# Patient Record
Sex: MALE | Race: WHITE | Employment: OTHER | URBAN - METROPOLITAN AREA
[De-identification: names, ages, dates, MRNs, and addresses within clinical notes are randomized per-mention and may not be internally consistent; named-entity substitution may affect disease eponyms.]

---

## 2017-01-01 ENCOUNTER — NURSE ONLY (OUTPATIENT)
Dept: CARDIOLOGY CLINIC | Age: 82
End: 2017-01-01

## 2017-01-01 ENCOUNTER — TELEPHONE (OUTPATIENT)
Dept: CARDIOLOGY CLINIC | Age: 82
End: 2017-01-01

## 2017-01-01 ENCOUNTER — HOSPITAL ENCOUNTER (OUTPATIENT)
Dept: OTHER | Age: 82
Discharge: HOME | End: 2017-04-10
Attending: INTERNAL MEDICINE | Admitting: INTERNAL MEDICINE

## 2017-01-01 ENCOUNTER — OFFICE VISIT (OUTPATIENT)
Dept: CARDIOLOGY CLINIC | Age: 82
End: 2017-01-01

## 2017-01-01 ENCOUNTER — HOSPITAL ENCOUNTER (OUTPATIENT)
Dept: OTHER | Age: 82
Discharge: HOME | End: 2017-08-07
Attending: INTERNAL MEDICINE | Admitting: INTERNAL MEDICINE

## 2017-01-01 ENCOUNTER — HOSPITAL ENCOUNTER (OUTPATIENT)
Dept: ULTRASOUND IMAGING | Age: 82
Discharge: HOME | End: 2017-05-03
Attending: INTERNAL MEDICINE | Admitting: INTERNAL MEDICINE

## 2017-01-01 ENCOUNTER — PROCEDURE VISIT (OUTPATIENT)
Dept: CARDIOLOGY CLINIC | Age: 82
End: 2017-01-01

## 2017-01-01 VITALS
WEIGHT: 172 LBS | DIASTOLIC BLOOD PRESSURE: 70 MMHG | HEART RATE: 80 BPM | BODY MASS INDEX: 24.62 KG/M2 | SYSTOLIC BLOOD PRESSURE: 130 MMHG | HEIGHT: 70 IN

## 2017-01-01 VITALS
BODY MASS INDEX: 23.95 KG/M2 | WEIGHT: 167.3 LBS | HEIGHT: 70 IN | DIASTOLIC BLOOD PRESSURE: 50 MMHG | SYSTOLIC BLOOD PRESSURE: 110 MMHG | HEART RATE: 60 BPM

## 2017-01-01 DIAGNOSIS — I25.5 ISCHEMIC CARDIOMYOPATHY: ICD-10-CM

## 2017-01-01 DIAGNOSIS — Z95.810 AUTOMATIC IMPLANTABLE CARDIOVERTER-DEFIBRILLATOR IN SITU: Chronic | ICD-10-CM

## 2017-01-01 DIAGNOSIS — I50.22 CHRONIC SYSTOLIC HEART FAILURE (HCC): ICD-10-CM

## 2017-01-01 DIAGNOSIS — N28.9 RENAL INSUFFICIENCY: ICD-10-CM

## 2017-01-01 DIAGNOSIS — I10 ESSENTIAL HYPERTENSION, BENIGN: Chronic | ICD-10-CM

## 2017-01-01 DIAGNOSIS — E78.2 MIXED HYPERLIPIDEMIA: ICD-10-CM

## 2017-01-01 DIAGNOSIS — I10 ESSENTIAL HYPERTENSION: ICD-10-CM

## 2017-01-01 DIAGNOSIS — I73.9 PERIPHERAL VASCULAR DISEASE (HCC): ICD-10-CM

## 2017-01-01 DIAGNOSIS — Z95.810 CARDIAC DEFIBRILLATOR IN PLACE: ICD-10-CM

## 2017-01-01 DIAGNOSIS — I50.20 CHF (CONGESTIVE HEART FAILURE), NYHA CLASS II, UNSPECIFIED FAILURE CHRONICITY, SYSTOLIC (HCC): Primary | Chronic | ICD-10-CM

## 2017-01-01 DIAGNOSIS — I25.10 ATHEROSCLEROSIS OF NATIVE CORONARY ARTERY OF NATIVE HEART WITHOUT ANGINA PECTORIS: Chronic | ICD-10-CM

## 2017-01-01 DIAGNOSIS — I48.20 CHRONIC ATRIAL FIBRILLATION (HCC): Chronic | ICD-10-CM

## 2017-01-01 DIAGNOSIS — I50.22 CHRONIC SYSTOLIC HEART FAILURE (HCC): Primary | ICD-10-CM

## 2017-01-01 DIAGNOSIS — I10 ESSENTIAL HYPERTENSION, BENIGN: Primary | Chronic | ICD-10-CM

## 2017-01-01 DIAGNOSIS — I10 ESSENTIAL (PRIMARY) HYPERTENSION: ICD-10-CM

## 2017-01-01 LAB
ALBUMIN SERPL-MCNC: 3.8 G/DL (ref 3.4–5)
ALT SERPL-CCNC: 14 U/L (ref 10–40)
ANION GAP SERPL CALCULATED.3IONS-SCNC: 15 MMOL/L (ref 3–16)
AST SERPL-CCNC: 24 U/L (ref 15–37)
BUN BLDV-MCNC: 44 MG/DL (ref 7–20)
BUN BLDV-MCNC: 54 MG/DL (ref 7–20)
BUN BLDV-MCNC: 60 MG/DL (ref 7–20)
CALCIUM SERPL-MCNC: 9.1 MG/DL (ref 8.3–10.6)
CALCIUM SERPL-MCNC: 9.2 MG/DL (ref 8.3–10.6)
CALCIUM SERPL-MCNC: 9.5 MG/DL (ref 8.3–10.6)
CHLORIDE BLD-SCNC: 101 MMOL/L (ref 99–110)
CHLORIDE BLD-SCNC: 97 MMOL/L (ref 99–110)
CHLORIDE BLD-SCNC: 99 MMOL/L (ref 99–110)
CHOLESTEROL, TOTAL: 125 MG/DL (ref 0–199)
CO2: 27 MMOL/L (ref 21–32)
CO2: 27 MMOL/L (ref 21–32)
CO2: 28 MMOL/L (ref 21–32)
CREAT SERPL-MCNC: 1.8 MG/DL (ref 0.8–1.3)
CREAT SERPL-MCNC: 1.8 MG/DL (ref 0.8–1.3)
CREAT SERPL-MCNC: 2 MG/DL (ref 0.8–1.3)
GFR AFRICAN AMERICAN: 38
GFR AFRICAN AMERICAN: 43
GFR AFRICAN AMERICAN: 43
GFR NON-AFRICAN AMERICAN: 32
GFR NON-AFRICAN AMERICAN: 36
GFR NON-AFRICAN AMERICAN: 36
GLUCOSE BLD-MCNC: 103 MG/DL (ref 70–99)
GLUCOSE BLD-MCNC: 118 MG/DL (ref 70–99)
GLUCOSE BLD-MCNC: 152 MG/DL (ref 70–99)
HDLC SERPL-MCNC: 33 MG/DL (ref 40–60)
LDL CHOLESTEROL CALCULATED: 68 MG/DL
PHOSPHORUS: 4 MG/DL (ref 2.5–4.9)
POTASSIUM SERPL-SCNC: 3.9 MMOL/L (ref 3.5–5.1)
POTASSIUM SERPL-SCNC: 4.2 MMOL/L (ref 3.5–5.1)
POTASSIUM SERPL-SCNC: 4.5 MMOL/L (ref 3.5–5.1)
SODIUM BLD-SCNC: 140 MMOL/L (ref 136–145)
SODIUM BLD-SCNC: 141 MMOL/L (ref 136–145)
SODIUM BLD-SCNC: 143 MMOL/L (ref 136–145)
TRIGL SERPL-MCNC: 119 MG/DL (ref 0–150)
VLDLC SERPL CALC-MCNC: 24 MG/DL

## 2017-01-01 PROCEDURE — 93297 REM INTERROG DEV EVAL ICPMS: CPT | Performed by: INTERNAL MEDICINE

## 2017-01-01 PROCEDURE — 93296 REM INTERROG EVL PM/IDS: CPT | Performed by: INTERNAL MEDICINE

## 2017-01-01 PROCEDURE — 93295 DEV INTERROG REMOTE 1/2/MLT: CPT | Performed by: INTERNAL MEDICINE

## 2017-01-01 PROCEDURE — 1036F TOBACCO NON-USER: CPT | Performed by: INTERNAL MEDICINE

## 2017-01-01 PROCEDURE — G8420 CALC BMI NORM PARAMETERS: HCPCS | Performed by: INTERNAL MEDICINE

## 2017-01-01 PROCEDURE — 4040F PNEUMOC VAC/ADMIN/RCVD: CPT | Performed by: INTERNAL MEDICINE

## 2017-01-01 PROCEDURE — 1123F ACP DISCUSS/DSCN MKR DOCD: CPT | Performed by: INTERNAL MEDICINE

## 2017-01-01 PROCEDURE — G8598 ASA/ANTIPLAT THER USED: HCPCS | Performed by: INTERNAL MEDICINE

## 2017-01-01 PROCEDURE — G8427 DOCREV CUR MEDS BY ELIG CLIN: HCPCS | Performed by: INTERNAL MEDICINE

## 2017-01-01 PROCEDURE — 93284 PRGRMG EVAL IMPLANTABLE DFB: CPT | Performed by: INTERNAL MEDICINE

## 2017-01-01 PROCEDURE — 99214 OFFICE O/P EST MOD 30 MIN: CPT | Performed by: INTERNAL MEDICINE

## 2017-01-01 PROCEDURE — G8484 FLU IMMUNIZE NO ADMIN: HCPCS | Performed by: INTERNAL MEDICINE

## 2017-01-01 PROCEDURE — 93290 INTERROG DEV EVAL ICPMS IP: CPT | Performed by: INTERNAL MEDICINE

## 2017-01-01 RX ORDER — LISINOPRIL 10 MG/1
10 TABLET ORAL DAILY
Qty: 90 TABLET | Refills: 3 | Status: SHIPPED | OUTPATIENT
Start: 2017-01-01 | End: 2017-01-01

## 2017-01-01 RX ORDER — DIGOXIN 125 MCG
125 TABLET ORAL DAILY
Qty: 90 TABLET | Refills: 3 | Status: SHIPPED | OUTPATIENT
Start: 2017-01-01 | End: 2017-01-01 | Stop reason: SDUPTHER

## 2017-01-01 RX ORDER — ASPIRIN 81 MG/1
81 TABLET ORAL DAILY
Qty: 90 TABLET | Refills: 3 | Status: SHIPPED | OUTPATIENT
Start: 2017-01-01

## 2017-01-01 RX ORDER — TORSEMIDE 100 MG/1
50 TABLET ORAL DAILY
Qty: 45 TABLET | Refills: 3 | Status: SHIPPED | OUTPATIENT
Start: 2017-01-01 | End: 2017-01-01 | Stop reason: SDUPTHER

## 2017-01-01 RX ORDER — EPLERENONE 25 MG/1
25 TABLET, FILM COATED ORAL DAILY
Qty: 90 TABLET | Refills: 3 | Status: SHIPPED | OUTPATIENT
Start: 2017-01-01 | End: 2018-01-01 | Stop reason: SINTOL

## 2017-01-01 RX ORDER — ATORVASTATIN CALCIUM 40 MG/1
40 TABLET, FILM COATED ORAL DAILY
Qty: 90 TABLET | Refills: 3 | Status: CANCELLED | OUTPATIENT
Start: 2017-01-01

## 2017-01-01 RX ORDER — CARVEDILOL 12.5 MG/1
TABLET ORAL
Qty: 180 TABLET | Refills: 3 | Status: SHIPPED | OUTPATIENT
Start: 2017-01-01 | End: 2017-01-01

## 2017-01-01 RX ORDER — ATORVASTATIN CALCIUM 40 MG/1
40 TABLET, FILM COATED ORAL DAILY
Qty: 90 TABLET | Refills: 3 | Status: SHIPPED | OUTPATIENT
Start: 2017-01-01

## 2017-01-01 RX ORDER — CLOPIDOGREL BISULFATE 75 MG/1
TABLET ORAL
Qty: 90 TABLET | Refills: 3 | Status: SHIPPED | OUTPATIENT
Start: 2017-01-01

## 2017-01-01 RX ORDER — LISINOPRIL 10 MG/1
TABLET ORAL
Qty: 90 TABLET | Refills: 3 | Status: SHIPPED | OUTPATIENT
Start: 2017-01-01 | End: 2017-01-01 | Stop reason: SDUPTHER

## 2017-01-01 RX ORDER — CARVEDILOL 12.5 MG/1
TABLET ORAL
Qty: 180 TABLET | Refills: 3 | Status: SHIPPED | OUTPATIENT
Start: 2017-01-01

## 2017-01-01 RX ORDER — TORSEMIDE 100 MG/1
50 TABLET ORAL DAILY
Qty: 45 TABLET | Refills: 3 | Status: SHIPPED | OUTPATIENT
Start: 2017-01-01

## 2017-01-01 RX ORDER — CARVEDILOL 12.5 MG/1
TABLET ORAL
Qty: 180 TABLET | Refills: 3 | Status: SHIPPED | OUTPATIENT
Start: 2017-01-01 | End: 2017-01-01 | Stop reason: SDUPTHER

## 2017-01-01 RX ORDER — LISINOPRIL 10 MG/1
TABLET ORAL
Qty: 90 TABLET | Refills: 3 | Status: SHIPPED | OUTPATIENT
Start: 2017-01-01 | End: 2018-01-01 | Stop reason: SINTOL

## 2017-01-01 RX ORDER — TORSEMIDE 100 MG/1
TABLET ORAL
Qty: 45 TABLET | Refills: 3 | Status: SHIPPED | OUTPATIENT
Start: 2017-01-01 | End: 2017-01-01

## 2017-01-01 RX ORDER — DIGOXIN 125 MCG
125 TABLET ORAL DAILY
Qty: 90 TABLET | Refills: 3 | Status: ON HOLD | OUTPATIENT
Start: 2017-01-01 | End: 2018-01-01 | Stop reason: HOSPADM

## 2017-01-01 RX ORDER — EPLERENONE 25 MG/1
TABLET, FILM COATED ORAL
Qty: 90 TABLET | Refills: 3 | Status: SHIPPED | OUTPATIENT
Start: 2017-01-01 | End: 2018-01-01 | Stop reason: SDUPTHER

## 2017-01-01 RX ORDER — CLOPIDOGREL BISULFATE 75 MG/1
TABLET ORAL
Qty: 90 TABLET | Refills: 3 | Status: SHIPPED | OUTPATIENT
Start: 2017-01-01 | End: 2017-01-01 | Stop reason: SDUPTHER

## 2017-01-18 ENCOUNTER — NURSE ONLY (OUTPATIENT)
Dept: CARDIOLOGY CLINIC | Age: 82
End: 2017-01-18

## 2017-01-18 DIAGNOSIS — Z95.810 CARDIAC DEFIBRILLATOR IN PLACE: ICD-10-CM

## 2017-01-18 DIAGNOSIS — I25.5 ISCHEMIC CARDIOMYOPATHY: ICD-10-CM

## 2017-01-18 DIAGNOSIS — I50.22 CHRONIC SYSTOLIC HEART FAILURE (HCC): ICD-10-CM

## 2017-01-18 DIAGNOSIS — Z95.810 AUTOMATIC IMPLANTABLE CARDIOVERTER-DEFIBRILLATOR IN SITU: Chronic | ICD-10-CM

## 2017-01-18 PROCEDURE — 93296 REM INTERROG EVL PM/IDS: CPT | Performed by: INTERNAL MEDICINE

## 2017-01-18 PROCEDURE — 93295 DEV INTERROG REMOTE 1/2/MLT: CPT | Performed by: INTERNAL MEDICINE

## 2017-01-18 PROCEDURE — 93297 REM INTERROG DEV EVAL ICPMS: CPT | Performed by: INTERNAL MEDICINE

## 2017-01-26 ENCOUNTER — TELEPHONE (OUTPATIENT)
Dept: CARDIOLOGY CLINIC | Age: 82
End: 2017-01-26

## 2017-10-03 NOTE — TELEPHONE ENCOUNTER
Does pt need any labs prior to 10/23/17 appt? Pls call to advise Thank you    Medication Refill    When was your last appointment with cardiology? 04/17/17 (upcoming appt 10/23/17)  (if 1year or longer, please schedule an appointment)    Medication needing refilled:  ALL the medications we refill for him except the Atorvastatin     Doseage of the medication:    How are you taking this medication (QD, BID, TID, QID, PRN):    Patient want a 30 or 90 day supply called in:90    Which Pharmacy are we sending the medication to:[facility], [address] [place] [address]    Pharmacy Phone number:    Pharmacy Fax number:

## 2017-10-23 NOTE — PROGRESS NOTES
[facility]   Cardiac Consultation    Referring Provider:  Nel Carroll MD     Chief Complaint   Patient presents with    6 Month Follow-Up    Congestive Heart Failure    Coronary Artery Disease    Hypertension      History of Present Illness:   Mr. Randy Starkey is seen today for management of ischemic cardiomyopathy/AICD, CAD, hyperlipidemia, hypertension. In the interval since his last visit, he was treated for gout by his pcp. Today, Mary Vanegas states that he has been feeling well overall. He walks with the aid of a cane/walker. He uses the walker in his house, but will only use a cane outside (in public) He has no chest pain, change in exertional shortness of breath palpitations or dizziness. He does have swelling in bilateral ankles. He is here today by himself    Past Medical History:   has a past medical history of Arthritis; CAD (coronary artery disease); CHF (congestive heart failure) ([address] and Hypertension. Surgical History:   has a past surgical history that includes fracture surgery; eye surgery; Cardiac surgery; and hernia repair. Social History:   reports that he has quit smoking. He has quit using smokeless tobacco. He reports that he drinks about 0.6 oz of alcohol per week . He reports that he does not use drugs. Family History:  family history includes Heart Disease in his brother, father, and mother.      Current Outpatient Prescriptions   Medication Sig Dispense Refill    lisinopril (PRINIVIL;ZESTRIL) 10 MG tablet Take 1 tablet by mouth  daily 90 tablet 3    clopidogrel (PLAVIX) 75 MG tablet Take 1 tablet by mouth  daily 90 tablet 3    carvedilol (COREG) 12.5 MG tablet Take 1 tablet by mouth  twice a day 180 tablet 3    digoxin (LANOXIN) 125 MCG tablet Take 1 tablet by mouth daily 90 tablet 3    torsemide (DEMADEX) 100 MG tablet Take 0.5 tablets by mouth daily 45 tablet 3    eplerenone (INSPRA) 25 MG tablet Take 1 tablet by mouth daily 90 tablet 3    aspirin 81 and normal   III/VI holosystolic murmur  · Cervical veins are not engorged  · The carotid upstroke is normal in amplitude and contour without delay or bruit  · Peripheral pulses are symmetrical and full  · There is no clubbing, cyanosis of the extremities. · Bilateral edema  · Femoral Arteries: 2+ and equal  · Pedal Pulses: 2+ and equal   Abdomen:  · No masses or tenderness  · Liver/Spleen: No Abnormalities Noted  Neurological/Psychiatric:  · Alert and oriented in all spheres  · Moves all extremities well  · Walks with cane  · No abnormalities of mood, affect, memory, mentation, or behavior are noted    Assessment:    1. Coronary atherosclerosis of native coronary artery:  Stable. No complaints of angina. Due to history of vascular disease would favor staying on DAPT with Plavix & aspirin. 4/2007 Myoview GXT > large size, severe apical, inferoseptal, inferior and inferolateral fixed defect with EF 39%. 2. CHF (congestive heart failure), NYHA class II: No evidence of CHF by exam today. Weighs himself daily--recommend support hose for bilateral edema   3. Other primary cardiomyopathies:  Stable. 2009 ECHO> EF 25%. 4. Automatic implantable cardiac defibrillator in situ: Interrogated regularly, no recent shocks.-   5. Essential hypertension, benign: BP (!) 110/50 (Site: Left Arm, Position: Sitting, Cuff Size: Medium Adult)   Pulse 60   Ht 5' 10\" (1.778 m)   Wt 167 lb 4.8 oz (75.9 kg)   BMI 24.01 kg/m²      6. Hyperlipidemia   4/17  Tc 125  hdl 33 ldl 68 tri 119   7. Renal insufficiency--8/7/17  Creat 1.8      Plan  Stable cardiac status at this time  1. Continue all medications  2. Call for any changes  3. Recommend support hose for swelling  4. Follow up in 6months, lipid liver renal panel prior      I appreciate the opportunity of cooperating in the care of this individual.    Tyshawn Mckenzie.  Christina Mercado M.D., [facility] [place]

## 2017-10-23 NOTE — LETTER
· Endocrine: No malaise, fatigue or temperature intolerance. No excessive thirst, fluid intake, or urination. No tremor. · Hematologic/Lymphatic: No abnormal bruising or bleeding, blood clots or swollen lymph nodes. · Allergic/Immunologic: No nasal congestion or hives. Physical Examination:    Vitals:    10/23/17 0953   BP: (!) 110/50   Pulse: 60        Constitutional and General Appearance:  Appears stated age, NAD  Respiratory:  · Normal excursion and expansion without use of accessory muscles  · Resp Auscultation: Normal breath sounds without dullness  Cardiovascular:  · The apical impulses not displaced  · Heart tones are crisp and normal   III/VI holosystolic murmur  · Cervical veins are not engorged  · The carotid upstroke is normal in amplitude and contour without delay or bruit  · Peripheral pulses are symmetrical and full  · There is no clubbing, cyanosis of the extremities. · Bilateral edema  · Femoral Arteries: 2+ and equal  · Pedal Pulses: 2+ and equal   Abdomen:  · No masses or tenderness  · Liver/Spleen: No Abnormalities Noted  Neurological/Psychiatric:  · Alert and oriented in all spheres  · Moves all extremities well  · Walks with cane  · No abnormalities of mood, affect, memory, mentation, or behavior are noted    Assessment:    1. Coronary atherosclerosis of native coronary artery:  Stable. No complaints of angina. Due to history of vascular disease would favor staying on DAPT with Plavix & aspirin. 4/2007 Myoview GXT > large size, severe apical, inferoseptal, inferior and inferolateral fixed defect with EF 39%. 2. CHF (congestive heart failure), NYHA class II: No evidence of CHF by exam today. Weighs himself daily--recommend support hose for bilateral edema   3. Other primary cardiomyopathies:  Stable. 2009 ECHO> EF 25%. 4. Automatic implantable cardiac defibrillator in situ: Interrogated regularly, no recent shocks. -

## 2017-10-23 NOTE — COMMUNICATION BODY
[address]   Cardiac Consultation    Referring Provider:  Shilpi Ching MD     Chief Complaint   Patient presents with    6 Month Follow-Up    Congestive Heart Failure    Coronary Artery Disease    Hypertension      History of Present Illness:   Mr. Florian Estrella is seen today for management of ischemic cardiomyopathy/AICD, CAD, hyperlipidemia, hypertension. In the interval since his last visit, he was treated for gout by his pcp. Today, Jluis Waldron states that he has been feeling well overall. He walks with the aid of a cane/walker. He uses the walker in his house, but will only use a cane outside (in public) He has no chest pain, change in exertional shortness of breath palpitations or dizziness. He does have swelling in bilateral ankles. He is here today by himself    Past Medical History:   has a past medical history of Arthritis; CAD (coronary artery disease); CHF (congestive heart failure) ([address] and Hypertension. Surgical History:   has a past surgical history that includes fracture surgery; eye surgery; Cardiac surgery; and hernia repair. Social History:   reports that he has quit smoking. He has quit using smokeless tobacco. He reports that he drinks about 0.6 oz of alcohol per week . He reports that he does not use drugs. Family History:  family history includes Heart Disease in his brother, father, and mother.      Current Outpatient Prescriptions   Medication Sig Dispense Refill    lisinopril (PRINIVIL;ZESTRIL) 10 MG tablet Take 1 tablet by mouth  daily 90 tablet 3    clopidogrel (PLAVIX) 75 MG tablet Take 1 tablet by mouth  daily 90 tablet 3    carvedilol (COREG) 12.5 MG tablet Take 1 tablet by mouth  twice a day 180 tablet 3    digoxin (LANOXIN) 125 MCG tablet Take 1 tablet by mouth daily 90 tablet 3    torsemide (DEMADEX) 100 MG tablet Take 0.5 tablets by mouth daily 45 tablet 3    eplerenone (INSPRA) 25 MG tablet Take 1 tablet by mouth daily 90 tablet 3    aspirin 81 MG EC tablet Take 1 tablet by mouth daily 90 tablet 3    atorvastatin (LIPITOR) 40 MG tablet Take 1 tablet by mouth daily 30 tablet 12    eplerenone (INSPRA) 25 MG tablet Take 1 tablet by mouth  daily 90 tablet 3     No current facility-administered medications for this visit. Allergies:  Review of patient's allergies indicates no known allergies. Review of Systems:   · Constitutional: there has been no unanticipated weight loss. There's been no change in energy level, sleep pattern, or activity level. · Eyes: No visual changes or diplopia. No scleral icterus. · ENT: No Headaches, hearing loss or vertigo. No mouth sores or sore throat. · Cardiovascular: Reviewed in HPI    · Respiratory: No cough or wheezing, no sputum production. No hematemesis. · Gastrointestinal: No abdominal pain, appetite loss, blood in stools. No change in bowel or bladder habits. · Genitourinary: No dysuria, trouble voiding, or hematuria. · Musculoskeletal:  General weakness/balance issues --arthritic back and ankle pain Using a cane. · Integumentary: No rash or pruritis. · Neurological: No headache, diplopia, change in muscle strength, numbness or tingling. No change in gait, balance, coordination, mood, affect, memory, mentation, behavior. · Psychiatric: No anxiety, no depression. · Endocrine: No malaise, fatigue or temperature intolerance. No excessive thirst, fluid intake, or urination. No tremor. · Hematologic/Lymphatic: No abnormal bruising or bleeding, blood clots or swollen lymph nodes. · Allergic/Immunologic: No nasal congestion or hives.     Physical Examination:    Vitals:    10/23/17 0953   BP: (!) 110/50   Pulse: 60        Constitutional and General Appearance:  Appears stated age, NAD  Respiratory:  · Normal excursion and expansion without use of accessory muscles  · Resp Auscultation: Normal breath sounds without dullness  Cardiovascular:  · The apical impulses not displaced  · Heart tones are crisp

## 2017-10-25 NOTE — PROGRESS NOTES
Carelink transmission shows normal sensing and pacing function. Patient remains in atrial fibrillation. See interrogation for more details. Optivol slightly elevated. Call out to pt.

## 2017-12-28 NOTE — TELEPHONE ENCOUNTER
Called regarding a prior auth for : eplerenone (INSPRA) 25 MG tablet    Please call to discuss.   Thank you [facility]

## 2018-01-01 ENCOUNTER — OFFICE VISIT (OUTPATIENT)
Dept: CARDIOLOGY CLINIC | Age: 83
End: 2018-01-01

## 2018-01-01 ENCOUNTER — HOSPITAL ENCOUNTER (OUTPATIENT)
Dept: OTHER | Age: 83
Discharge: HOME | End: 2018-03-31
Attending: NURSE PRACTITIONER | Admitting: NURSE PRACTITIONER

## 2018-01-01 ENCOUNTER — TELEPHONE (OUTPATIENT)
Dept: CARDIOLOGY CLINIC | Age: 83
End: 2018-01-01

## 2018-01-01 ENCOUNTER — NURSE ONLY (OUTPATIENT)
Dept: CARDIOLOGY CLINIC | Age: 83
End: 2018-01-01

## 2018-01-01 ENCOUNTER — HOSPITAL ENCOUNTER (OUTPATIENT)
Dept: OTHER | Age: 83
Discharge: HOME | End: 2018-04-30
Attending: NURSE PRACTITIONER | Admitting: NURSE PRACTITIONER

## 2018-01-01 VITALS
SYSTOLIC BLOOD PRESSURE: 128 MMHG | WEIGHT: 171 LBS | HEART RATE: 66 BPM | OXYGEN SATURATION: 91 % | BODY MASS INDEX: 24.54 KG/M2 | DIASTOLIC BLOOD PRESSURE: 70 MMHG

## 2018-01-01 VITALS
WEIGHT: 184 LBS | SYSTOLIC BLOOD PRESSURE: 106 MMHG | OXYGEN SATURATION: 96 % | HEIGHT: 70 IN | DIASTOLIC BLOOD PRESSURE: 74 MMHG | HEART RATE: 62 BPM | BODY MASS INDEX: 26.34 KG/M2

## 2018-01-01 DIAGNOSIS — I25.10 ATHEROSCLEROSIS OF NATIVE CORONARY ARTERY OF NATIVE HEART WITHOUT ANGINA PECTORIS: Chronic | ICD-10-CM

## 2018-01-01 DIAGNOSIS — Z95.810 AUTOMATIC IMPLANTABLE CARDIOVERTER-DEFIBRILLATOR IN SITU: Chronic | ICD-10-CM

## 2018-01-01 DIAGNOSIS — Z95.810 CARDIAC DEFIBRILLATOR IN PLACE: ICD-10-CM

## 2018-01-01 DIAGNOSIS — R06.09 DOE (DYSPNEA ON EXERTION): ICD-10-CM

## 2018-01-01 DIAGNOSIS — I50.21 ACUTE SYSTOLIC CONGESTIVE HEART FAILURE, NYHA CLASS 2 (HCC): Primary | Chronic | ICD-10-CM

## 2018-01-01 DIAGNOSIS — E78.2 MIXED HYPERLIPIDEMIA: ICD-10-CM

## 2018-01-01 DIAGNOSIS — I10 ESSENTIAL HYPERTENSION, BENIGN: Chronic | ICD-10-CM

## 2018-01-01 DIAGNOSIS — I25.5 ISCHEMIC CARDIOMYOPATHY: ICD-10-CM

## 2018-01-01 DIAGNOSIS — I25.10 ATHEROSCLEROSIS OF NATIVE CORONARY ARTERY OF NATIVE HEART WITHOUT ANGINA PECTORIS: Primary | Chronic | ICD-10-CM

## 2018-01-01 DIAGNOSIS — I50.22 CHRONIC SYSTOLIC HEART FAILURE (HCC): ICD-10-CM

## 2018-01-01 DIAGNOSIS — I50.21 ACUTE SYSTOLIC CONGESTIVE HEART FAILURE, NYHA CLASS 2 (HCC): Chronic | ICD-10-CM

## 2018-01-01 LAB
ANION GAP SERPL CALCULATED.3IONS-SCNC: 11 MMOL/L (ref 3–16)
BUN BLDV-MCNC: 85 MG/DL (ref 7–20)
CALCIUM SERPL-MCNC: 9 MG/DL (ref 8.3–10.6)
CHLORIDE BLD-SCNC: 93 MMOL/L (ref 99–110)
CO2: 29 MMOL/L (ref 21–32)
CREAT SERPL-MCNC: 2.3 MG/DL (ref 0.8–1.3)
GFR AFRICAN AMERICAN: 33
GFR NON-AFRICAN AMERICAN: 27
GLUCOSE BLD-MCNC: 150 MG/DL (ref 70–99)
POTASSIUM SERPL-SCNC: 5.1 MMOL/L (ref 3.5–5.1)
PRO-BNP: 5548 PG/ML (ref 0–449)
SODIUM BLD-SCNC: 133 MMOL/L (ref 136–145)

## 2018-01-01 PROCEDURE — 1036F TOBACCO NON-USER: CPT | Performed by: NURSE PRACTITIONER

## 2018-01-01 PROCEDURE — 4040F PNEUMOC VAC/ADMIN/RCVD: CPT | Performed by: NURSE PRACTITIONER

## 2018-01-01 PROCEDURE — 1123F ACP DISCUSS/DSCN MKR DOCD: CPT | Performed by: NURSE PRACTITIONER

## 2018-01-01 PROCEDURE — 4040F PNEUMOC VAC/ADMIN/RCVD: CPT | Performed by: INTERNAL MEDICINE

## 2018-01-01 PROCEDURE — 1036F TOBACCO NON-USER: CPT | Performed by: INTERNAL MEDICINE

## 2018-01-01 PROCEDURE — 93295 DEV INTERROG REMOTE 1/2/MLT: CPT | Performed by: INTERNAL MEDICINE

## 2018-01-01 PROCEDURE — G8427 DOCREV CUR MEDS BY ELIG CLIN: HCPCS | Performed by: INTERNAL MEDICINE

## 2018-01-01 PROCEDURE — 99214 OFFICE O/P EST MOD 30 MIN: CPT | Performed by: INTERNAL MEDICINE

## 2018-01-01 PROCEDURE — G8427 DOCREV CUR MEDS BY ELIG CLIN: HCPCS | Performed by: NURSE PRACTITIONER

## 2018-01-01 PROCEDURE — 1111F DSCHRG MED/CURRENT MED MERGE: CPT | Performed by: NURSE PRACTITIONER

## 2018-01-01 PROCEDURE — 93296 REM INTERROG EVL PM/IDS: CPT | Performed by: INTERNAL MEDICINE

## 2018-01-01 PROCEDURE — G8598 ASA/ANTIPLAT THER USED: HCPCS | Performed by: INTERNAL MEDICINE

## 2018-01-01 PROCEDURE — G8598 ASA/ANTIPLAT THER USED: HCPCS | Performed by: NURSE PRACTITIONER

## 2018-01-01 PROCEDURE — G8420 CALC BMI NORM PARAMETERS: HCPCS | Performed by: NURSE PRACTITIONER

## 2018-01-01 PROCEDURE — 99214 OFFICE O/P EST MOD 30 MIN: CPT | Performed by: NURSE PRACTITIONER

## 2018-01-01 PROCEDURE — 1123F ACP DISCUSS/DSCN MKR DOCD: CPT | Performed by: INTERNAL MEDICINE

## 2018-01-01 PROCEDURE — 93297 REM INTERROG DEV EVAL ICPMS: CPT | Performed by: INTERNAL MEDICINE

## 2018-01-01 PROCEDURE — G8484 FLU IMMUNIZE NO ADMIN: HCPCS | Performed by: INTERNAL MEDICINE

## 2018-01-01 PROCEDURE — G8484 FLU IMMUNIZE NO ADMIN: HCPCS | Performed by: NURSE PRACTITIONER

## 2018-01-01 PROCEDURE — G8419 CALC BMI OUT NRM PARAM NOF/U: HCPCS | Performed by: INTERNAL MEDICINE

## 2018-01-01 RX ORDER — SPIRONOLACTONE 25 MG/1
12.5 TABLET ORAL DAILY
COMMUNITY
End: 2018-01-01 | Stop reason: SDUPTHER

## 2018-01-01 RX ORDER — SPIRONOLACTONE 25 MG/1
12.5 TABLET ORAL DAILY
Qty: 45 TABLET | Refills: 2 | Status: SHIPPED | OUTPATIENT
Start: 2018-01-01 | End: 2018-01-01 | Stop reason: SINTOL

## 2018-01-10 NOTE — TELEPHONE ENCOUNTER
Spoke with patient informed him that his insurance will not cover his Inspra 25 mg and that he would like for him to aldactone 12.5 mg daily.   E prescribed prescription for aldactone 25 mg # 45 with 2 refills to [facility] [phone]

## 2018-01-31 NOTE — PROGRESS NOTES
Carelink transmission shows normal functioning device. Atrial arrhythmias noted. No anti coagulation per . Optivol elevated, call to pt for symptoms.

## 2018-02-14 NOTE — TELEPHONE ENCOUNTER
Pt was seen at foot doctor on Monday and was referred to PCP who stated pt's lungs seemed fine. Pt is c/o depression, has no CP, but has some swelling. No complaints of weight gain. Has been SOB x Saturday but pt's daughter doesn't visually seen labored breathing.  Please advise thank you

## 2018-02-19 PROBLEM — E87.70 VOLUME OVERLOAD: Status: ACTIVE | Noted: 2018-01-01

## 2018-02-19 PROBLEM — R06.09 DOE (DYSPNEA ON EXERTION): Status: ACTIVE | Noted: 2018-01-01

## 2018-02-19 NOTE — LETTER
[address] [address] [address] [address] [address] [address]  Phone: [phone]  Fax: [phone]    Price Snowden MD        March 7, 2018     Tin Mott, [id number]  [facility] [address]    Patient: Rajinder Armstrong  MR Number: [id number]  YOB: 1930  Date of Visit: 2/19/2018    Dear Dr. Tin Mott:    Below are the relevant portions of my assessment and plan of care. [address]   Cardiac Consultation    Referring Provider:  Tin Mott MD     Chief Complaint   Patient presents with    Coronary Artery Disease    Congestive Heart Failure    Cardiomyopathy    Shortness of Breath     when bending, can't sleep    Dizziness     low BP      History of Present Illness:   Mr. Teresita Okeefe is seen today for management of ischemic cardiomyopathy/AICD, CAD, hyperlipidemia, hypertension. Today, he arrives in a wheelchair. He reports that he has been having increased shortness of breath even when he just bends over. He c/o difficulty sleeping as well. His LE are also swelling more; he is up 17# since October 2017. He denies chest pain, palpitations. Some dizziness with positional changes. He lives alone, has his son with him for the visit. Past Medical History:   has a past medical history of Arthritis; CAD (coronary artery disease); CHF (congestive heart failure) ([address] and Hypertension. Surgical History:   has a past surgical history that includes fracture surgery; eye surgery; Cardiac surgery; and hernia repair. Social History:   reports that he has quit smoking. He has quit using smokeless tobacco. He reports that he drinks about 0.6 oz of alcohol per week . He reports that he does not use drugs. Family History:  family history includes Heart Disease in his brother, father, and mother.      Current Outpatient Prescriptions   Medication Sig Dispense Refill /74 (Site: Left Arm, Position: Sitting, Cuff Size: Medium Adult)   Pulse 62   Ht 5' 10\" (1.778 m)   Wt 184 lb (83.5 kg)   SpO2 96%   BMI 26.40 kg/m²     6. Hyperlipidemia   4/17>  Tc 125  hdl 33 ldl 68 tri 119   7. Renal insufficiency: 8/7/17>  Creat 1.8      Plan:  Admit for acute systolic heart failure and looking much more frail. Concerned about his ability to live on his own    I appreciate the opportunity of cooperating in the care of this individual.    Tammy Smiley. Mau Silverman M.D., [facility] [place]            If you have questions, please do not hesitate to call me. I look forward to following [place] along with you.     Sincerely,        Dar Alanis MD

## 2018-03-02 PROBLEM — E78.2 MIXED HYPERLIPIDEMIA: Status: ACTIVE | Noted: 2018-01-01

## 2018-03-02 NOTE — LETTER
Component Value Date    LABVLDL 24 04/10/2017    LABVLDL 27 10/11/2016    LABVLDL 43 12/30/2015     Radiology Review:  Pertinent images / reports were reviewed as a part of this visit and reveals the following:    Last ECHO: 2/20/18   -Very technically difficult and limited exam due to body habitus and off   axis windows. -Global left ventricular function is mild-to-moderately decreased with   ejection fraction estimated from 35-40%. Paradoxical septal motion. -Global hypokinesis noted. -Biatrial enlargement.   -The right ventricle appears to be enlarged. -Moderate septal hypertrophy.   -Aortic valve appears sclerotic but opens adequately. -Mild aortic regurgitation is present.   -Thickened mitral valve without evidence of stenosis. Moderate mitral   annular calcification. Severe mitral regurgitation.   -There is severe tricuspid regurgitation with RVSP estimated at 45 mmHg. -Indeterminate diastolic function.   -Dilated IVC with poor inspiration collapse consistent with elevated right   atrial pressure.   -Large pleural effusion noted measuring 12.0 cm.   -Pacemaker / ICD lead was visualized in the right heart. Assessment:   Congestive heart failure   2/20/18: last ECHO: EF 35-40%   on Coreg and Lisinopril   Patient appears stable on physical exam    Coronary artery disease   Assessment: s/p bypass 1993,no angina    Due to history of vascular disease would favor staying on DAPT with Plavix & aspirin. 4/2007 Myoview GXT > large size, severe apical, inferoseptal, inferior and inferolateral fixed defect with EF 39%.         Automatic implantable cardioverter-defibrillator in situ (5/4/2011)  Currently in Paced rhythm  Device is checked on a regular basis      History of atrial fibrillation   currently in regular  Patient is too high fall risk for NOAC   currently on ASA and Digoxin      Hypertension  Today's B/P- 128/70  Stable  Continue current medications    Hyperlipidemia  On Lipitor

## 2018-03-05 NOTE — TELEPHONE ENCOUNTER
Assessment:   Congestive heart failure   2/20/18: last ECHO: EF 35-40%   on Coreg and Lisinopril   Patient appears stable on physical exam     Coronary artery disease   Assessment: s/p bypass 1993,no angina   Due to history of vascular disease would favor staying on DAPT with Plavix & aspirin. 4/2007 Myoview GXT > large size, severe apical, inferoseptal, inferior and inferolateral fixed defect with EF 39%.        Automatic implantable cardioverter-defibrillator in situ (5/4/2011)  Currently in Paced rhythm  Device is checked on a regular basis     History of atrial fibrillation   currently in regular  Patient is too high fall risk for NOAC   currently on ASA and Digoxin       Hypertension  Today's B/P- 128/70  Stable  Continue current medications     Hyperlipidemia  On Lipitor  4/10/17: HDL: 33, LDL: 68     Patient  is stable since hospital discharge.     Plan:   Continue current medications  Patient has an appointment with renal next week and labs will be drawn. Follow up in one month with [facility]      I have addressed the patient's cardiac risk factors and adjusted pharmacologic treatment as needed. In addition, I have reinforced the need for patient directed risk factor modification.     Further evaluation will be based upon the patient's clinical course and testing results.     All questions and concerns were addressed to the patient/family. Alternatives to  treatment were discussed.      The patient  currently  is not smoking. The risks related to smoking were reviewed with the patient. Recommend maintaining a smoke-free lifestyle. Products available for smoking cessation were discussed.     Daily weight, low sodium diet were discussed. Patient instructed to call the office with a weight gain: > 3 lbs over night or 5 lbs in one week; swelling, SOB/orthopnea/PND     Dual Antiplatelet therapy has been prescribed for this patient. Education conducted on adverse reactions including bleeding was discussed.  The patient

## 2018-03-07 NOTE — COMMUNICATION BODY
displaced  · Heart tones are crisp and normal   III/VI holosystolic murmur  · Cervical veins are not engorged  · The carotid upstroke is normal in amplitude and contour without delay or bruit  · Peripheral pulses are symmetrical and full  · There is no clubbing, cyanosis of the extremities. · Bilateral edema  · Femoral Arteries: 2+ and equal  · Pedal Pulses: 2+ and equal   Abdomen:  · No masses or tenderness  · Liver/Spleen: No Abnormalities Noted  Neurological/Psychiatric:  · Alert and oriented in all spheres  · Moves all extremities well  · Walks with cane  · No abnormalities of mood, affect, memory, mentation, or behavior are noted    Assessment:    1. Coronary atherosclerosis of native coronary artery:  Stable. No complaints of angina. Due to history of vascular disease would favor staying on DAPT with Plavix & aspirin. 4/2007 Myoview GXT > large size, severe apical, inferoseptal, inferior and inferolateral fixed defect with EF 39%. 2. CHF (congestive heart failure), NYHA class II: LE edema noted. Having orthopnea and increased dyspnea during the day. Weighs himself daily -- up 17# since October. Have recommended support hose for bilateral edema. 3. Other primary cardiomyopathies:  Stable. 2009 ECHO> EF 25%. 4. Automatic implantable cardiac defibrillator in situ: Interrogated regularly, no recent shocks. 5. Essential hypertension, benign:  Stable  /74 (Site: Left Arm, Position: Sitting, Cuff Size: Medium Adult)   Pulse 62   Ht 5' 10\" (1.778 m)   Wt 184 lb (83.5 kg)   SpO2 96%   BMI 26.40 kg/m²     6. Hyperlipidemia   4/17>  Tc 125  hdl 33 ldl 68 tri 119   7. Renal insufficiency: 8/7/17>  Creat 1.8      Plan:  Admit for acute systolic heart failure and looking much more frail. Concerned about his ability to live on his own    I appreciate the opportunity of cooperating in the care of this individual.    Rhiannon Courtney.  Catalino Jones M.D., [facility] [place]

## 2018-03-15 NOTE — TELEPHONE ENCOUNTER
I spoke with the patient's daughter in law and increase his Demadex to 100 mg x one day. Please schedule patient on  3/19/18 at 9:45.

## 2018-03-15 NOTE — TELEPHONE ENCOUNTER
Pt has gained 5.5 lbs since Mon 03/12/18 it is 173.75 lbs today. Yesterday increased water pill (doubled it) pt has only gone to the restroom 6 times between 6 pm - now. Pt has SOB. Pt only had salad for dinner last night. Pt did start oxygen last night. Need to know what to do now?  Pls call to advise Thank you

## 2018-03-17 PROBLEM — I50.9 CHF WITH CARDIOMYOPATHY (HCC): Status: ACTIVE | Noted: 2018-01-01

## 2018-03-17 PROBLEM — I42.9 CHF WITH CARDIOMYOPATHY (HCC): Status: ACTIVE | Noted: 2018-01-01
